# Patient Record
Sex: MALE | Race: WHITE
[De-identification: names, ages, dates, MRNs, and addresses within clinical notes are randomized per-mention and may not be internally consistent; named-entity substitution may affect disease eponyms.]

---

## 2022-11-03 ENCOUNTER — HOSPITAL ENCOUNTER (OUTPATIENT)
Dept: HOSPITAL 75 - ER | Age: 62
LOS: 3 days | Discharge: HOME | End: 2022-11-06
Attending: INTERNAL MEDICINE
Payer: COMMERCIAL

## 2022-11-03 VITALS — WEIGHT: 254.19 LBS | BODY MASS INDEX: 38.97 KG/M2 | HEIGHT: 67.72 IN

## 2022-11-03 VITALS — DIASTOLIC BLOOD PRESSURE: 92 MMHG | SYSTOLIC BLOOD PRESSURE: 132 MMHG

## 2022-11-03 VITALS — SYSTOLIC BLOOD PRESSURE: 119 MMHG | DIASTOLIC BLOOD PRESSURE: 84 MMHG

## 2022-11-03 VITALS — DIASTOLIC BLOOD PRESSURE: 98 MMHG | SYSTOLIC BLOOD PRESSURE: 138 MMHG

## 2022-11-03 VITALS — DIASTOLIC BLOOD PRESSURE: 90 MMHG | SYSTOLIC BLOOD PRESSURE: 135 MMHG

## 2022-11-03 VITALS — DIASTOLIC BLOOD PRESSURE: 80 MMHG | SYSTOLIC BLOOD PRESSURE: 128 MMHG

## 2022-11-03 VITALS — SYSTOLIC BLOOD PRESSURE: 125 MMHG | DIASTOLIC BLOOD PRESSURE: 84 MMHG

## 2022-11-03 VITALS — DIASTOLIC BLOOD PRESSURE: 78 MMHG | SYSTOLIC BLOOD PRESSURE: 117 MMHG

## 2022-11-03 DIAGNOSIS — E66.9: ICD-10-CM

## 2022-11-03 DIAGNOSIS — I25.10: ICD-10-CM

## 2022-11-03 DIAGNOSIS — R73.9: ICD-10-CM

## 2022-11-03 DIAGNOSIS — Z79.899: ICD-10-CM

## 2022-11-03 DIAGNOSIS — I10: ICD-10-CM

## 2022-11-03 DIAGNOSIS — I21.4: Primary | ICD-10-CM

## 2022-11-03 DIAGNOSIS — E78.5: ICD-10-CM

## 2022-11-03 LAB
ALBUMIN SERPL-MCNC: 4.1 GM/DL (ref 3.2–4.5)
ALP SERPL-CCNC: 85 U/L (ref 40–136)
ALT SERPL-CCNC: 21 U/L (ref 0–55)
APTT BLD: 33 SEC (ref 24–35)
BASOPHILS # BLD AUTO: 0.1 10^3/UL (ref 0–0.1)
BASOPHILS NFR BLD AUTO: 1 % (ref 0–10)
BILIRUB SERPL-MCNC: 0.4 MG/DL (ref 0.1–1)
BUN/CREAT SERPL: 15
CALCIUM SERPL-MCNC: 9 MG/DL (ref 8.5–10.1)
CHLORIDE SERPL-SCNC: 104 MMOL/L (ref 98–107)
CO2 SERPL-SCNC: 26 MMOL/L (ref 21–32)
CREAT SERPL-MCNC: 1.33 MG/DL (ref 0.6–1.3)
EOSINOPHIL # BLD AUTO: 0.1 10^3/UL (ref 0–0.3)
EOSINOPHIL NFR BLD AUTO: 1 % (ref 0–10)
GFR SERPLBLD BASED ON 1.73 SQ M-ARVRAT: 60 ML/MIN
GLUCOSE SERPL-MCNC: 203 MG/DL (ref 70–105)
HCT VFR BLD CALC: 45 % (ref 40–54)
HGB BLD-MCNC: 14.4 G/DL (ref 13.3–17.7)
INR PPP: 1 (ref 0.8–1.4)
LIPASE SERPL-CCNC: 29 U/L (ref 8–78)
LYMPHOCYTES # BLD AUTO: 1.2 10^3/UL (ref 1–4)
LYMPHOCYTES NFR BLD AUTO: 20 % (ref 12–44)
MAGNESIUM SERPL-MCNC: 2.1 MG/DL (ref 1.6–2.4)
MANUAL DIFFERENTIAL PERFORMED BLD QL: NO
MCH RBC QN AUTO: 28 PG (ref 25–34)
MCHC RBC AUTO-ENTMCNC: 32 G/DL (ref 32–36)
MCV RBC AUTO: 87 FL (ref 80–99)
MONOCYTES # BLD AUTO: 0.5 10^3/UL (ref 0–1)
MONOCYTES NFR BLD AUTO: 9 % (ref 0–12)
NEUTROPHILS # BLD AUTO: 4.4 10^3/UL (ref 1.8–7.8)
NEUTROPHILS NFR BLD AUTO: 69 % (ref 42–75)
PLATELET # BLD: 289 10^3/UL (ref 130–400)
PMV BLD AUTO: 8.8 FL (ref 9–12.2)
POTASSIUM SERPL-SCNC: 4.4 MMOL/L (ref 3.6–5)
PROT SERPL-MCNC: 7.6 GM/DL (ref 6.4–8.2)
PROTHROMBIN TIME: 13.5 SEC (ref 12.2–14.7)
SODIUM SERPL-SCNC: 140 MMOL/L (ref 135–145)
WBC # BLD AUTO: 6.3 10^3/UL (ref 4.3–11)

## 2022-11-03 PROCEDURE — 80053 COMPREHEN METABOLIC PANEL: CPT

## 2022-11-03 PROCEDURE — 71045 X-RAY EXAM CHEST 1 VIEW: CPT

## 2022-11-03 PROCEDURE — 83735 ASSAY OF MAGNESIUM: CPT

## 2022-11-03 PROCEDURE — 80061 LIPID PANEL: CPT

## 2022-11-03 PROCEDURE — 36415 COLL VENOUS BLD VENIPUNCTURE: CPT

## 2022-11-03 PROCEDURE — 99285 EMERGENCY DEPT VISIT HI MDM: CPT

## 2022-11-03 PROCEDURE — 85025 COMPLETE CBC W/AUTO DIFF WBC: CPT

## 2022-11-03 PROCEDURE — 93041 RHYTHM ECG TRACING: CPT

## 2022-11-03 PROCEDURE — 84484 ASSAY OF TROPONIN QUANT: CPT

## 2022-11-03 PROCEDURE — 85730 THROMBOPLASTIN TIME PARTIAL: CPT

## 2022-11-03 PROCEDURE — 85027 COMPLETE CBC AUTOMATED: CPT

## 2022-11-03 PROCEDURE — 36140 INTRO NDL ICATH UPR/LXTR ART: CPT

## 2022-11-03 PROCEDURE — 93458 L HRT ARTERY/VENTRICLE ANGIO: CPT

## 2022-11-03 PROCEDURE — 71275 CT ANGIOGRAPHY CHEST: CPT

## 2022-11-03 PROCEDURE — 90686 IIV4 VACC NO PRSV 0.5 ML IM: CPT

## 2022-11-03 PROCEDURE — 93005 ELECTROCARDIOGRAM TRACING: CPT

## 2022-11-03 PROCEDURE — 83690 ASSAY OF LIPASE: CPT

## 2022-11-03 PROCEDURE — 93572 IV DOP VEL&/PRESS C FLO EA: CPT

## 2022-11-03 PROCEDURE — 83874 ASSAY OF MYOGLOBIN: CPT

## 2022-11-03 PROCEDURE — 93306 TTE W/DOPPLER COMPLETE: CPT

## 2022-11-03 PROCEDURE — 93571 IV DOP VEL&/PRESS C FLO 1ST: CPT

## 2022-11-03 PROCEDURE — 85379 FIBRIN DEGRADATION QUANT: CPT

## 2022-11-03 PROCEDURE — 85610 PROTHROMBIN TIME: CPT

## 2022-11-03 PROCEDURE — 83880 ASSAY OF NATRIURETIC PEPTIDE: CPT

## 2022-11-03 PROCEDURE — 82947 ASSAY GLUCOSE BLOOD QUANT: CPT

## 2022-11-03 RX ADMIN — SODIUM CHLORIDE SCH MLS/HR: 900 INJECTION, SOLUTION INTRAVENOUS at 21:12

## 2022-11-03 RX ADMIN — METOPROLOL TARTRATE SCH MG: 25 TABLET, FILM COATED ORAL at 21:06

## 2022-11-03 RX ADMIN — DOCUSATE SODIUM SCH MG: 100 CAPSULE ORAL at 21:11

## 2022-11-03 RX ADMIN — SODIUM CHLORIDE SCH MLS/HR: 900 INJECTION, SOLUTION INTRAVENOUS at 17:54

## 2022-11-03 RX ADMIN — INSULIN ASPART SCH UNIT: 100 INJECTION, SOLUTION INTRAVENOUS; SUBCUTANEOUS at 21:11

## 2022-11-03 RX ADMIN — SENNOSIDES SCH MG: 8.6 TABLET, FILM COATED ORAL at 21:11

## 2022-11-03 NOTE — DIAGNOSTIC IMAGING REPORT
CLINICAL INDICATION: Patient with chest pain.



EXAM: Portable chest x-ray upright view.



COMPARISON: None.



FINDINGS:



Lungs/pleura: Lungs are clear. There is no pneumothorax. There is

no pleural effusion.



Mediastinum: Unremarkable. 



Pulmonary vasculature: Unremarkable.



Heart: Cardiac silhouette is upper limits of normal for portable

projection.



Bones/extrathoracic soft tissue: There are hypertrophic spurs

involving the thoracic spine.



IMPRESSION:

There is no radiographic evidence of acute cardiopulmonary

process.



Dictated by: 



  Dictated on workstation # [id number]

## 2022-11-03 NOTE — CONSULTATION-CARDIOLOGY
HPI-Cardiology


Cardiology Consultation


Date of Consultation


11/3/22


Date of Admission





Time Seen by Provider:  14:35


Indication:  Chest pain





HPI


62-year-old gentleman with history of hypertension, hyperlipidemia, has been 

having chest pain on and off for the past week.  Became worse today.  Came into 

the emergency room and he was having active pain.  EKG did not show any acute 

changes, had a strong family history of heart disease.  Noted to have elevation 

in troponin level.





Home Medications & Allergies


Allergies:  


Coded Allergies:  


     No Known Drug Allergies (Unverified , 3/21/07)


Home Medication List Reviewed:  Yes





PMH-Social-Family Hx


Patient Social History


Marital Status:  


Employed/Student:  employed


Have you traveled recently?:  No


Alcohol Use?:  No





Past Medical History


Discussed below





Family Medical History


Significant Family History:  CAD Under 55 Years Old, CAD Over 55 Years Old





Review of Systems-General


Review of Systems


Constitutional:  no symptoms reported, see HPI


EENTM:  see HPI, no symptoms reported


Respiratory:  no symptoms reported, see HPI, dyspnea on exertion


Cardiovascular:  see HPI, chest pain; No edema, No Hx of Intervention, No 

palpitations, No syncope, No vascular heart diseas, No other


Gastrointestinal:  no symptoms reported, see HPI


Genitourinary:  no symptoms reported, see HPI


Musculoskeletal:  no symptoms reported, see HPI


Skin:  no symptoms reported, see HPI


Psychiatric/Neurological:  No Symptoms Reported, See HPI





Reviewed Test Results


Reviewed Test Results


Lab





Laboratory Tests








Test


 11/3/22


10:54 11/3/22


13:00 Range/Units


 


 


White Blood Count


 6.3 


 


 4.3-11.0


10^3/uL


 


Red Blood Count


 5.17 


 


 4.30-5.52


10^6/uL


 


Hemoglobin 14.4   13.3-17.7  g/dL


 


Hematocrit 45   40-54  %


 


Mean Corpuscular Volume 87   80-99  fL


 


Mean Corpuscular Hemoglobin 28   25-34  pg


 


Mean Corpuscular Hemoglobin


Concent 32 


 


 32-36  g/dL





 


Red Cell Distribution Width 13.1   10.0-14.5  %


 


Platelet Count


 289 


 


 130-400


10^3/uL


 


Mean Platelet Volume 8.8 L  9.0-12.2  fL


 


Immature Granulocyte % (Auto) 1    %


 


Neutrophils (%) (Auto) 69   42-75  %


 


Lymphocytes (%) (Auto) 20   12-44  %


 


Monocytes (%) (Auto) 9   0-12  %


 


Eosinophils (%) (Auto) 1   0-10  %


 


Basophils (%) (Auto) 1   0-10  %


 


Neutrophils # (Auto)


 4.4 


 


 1.8-7.8


10^3/uL


 


Lymphocytes # (Auto)


 1.2 


 


 1.0-4.0


10^3/uL


 


Monocytes # (Auto)


 0.5 


 


 0.0-1.0


10^3/uL


 


Eosinophils # (Auto)


 0.1 


 


 0.0-0.3


10^3/uL


 


Basophils # (Auto)


 0.1 


 


 0.0-0.1


10^3/uL


 


Immature Granulocyte # (Auto)


 0.1 


 


 0.0-0.1


10^3/uL


 


Prothrombin Time 13.5   12.2-14.7  SEC


 


INR Comment 1.0   0.8-1.4  


 


Activated Partial


Thromboplast Time 33 


 


 24-35  SEC





 


D-Dimer


 0.66 H


 


 0.00-0.49


UG/ML


 


Sodium Level 140   135-145  MMOL/L


 


Potassium Level 4.4   3.6-5.0  MMOL/L


 


Chloride Level 104     MMOL/L


 


Carbon Dioxide Level 26   21-32  MMOL/L


 


Anion Gap 10   5-14  MMOL/L


 


Blood Urea Nitrogen 20 H  7-18  MG/DL


 


Creatinine


 1.33 H


 


 0.60-1.30


MG/DL


 


Estimat Glomerular Filtration


Rate 60 


 


  





 


BUN/Creatinine Ratio 15    


 


Glucose Level 203 H    MG/DL


 


Calcium Level 9.0   8.5-10.1  MG/DL


 


Corrected Calcium 8.9   8.5-10.1  MG/DL


 


Magnesium Level 2.1   1.6-2.4  MG/DL


 


Total Bilirubin 0.4   0.1-1.0  MG/DL


 


Aspartate Amino Transf


(AST/SGOT) 21 


 


 5-34  U/L





 


Alanine Aminotransferase


(ALT/SGPT) 21 


 


 0-55  U/L





 


Alkaline Phosphatase 85     U/L


 


Myoglobin


 264.2 H


 


 10.0-92.0


NG/ML


 


Troponin I 0.066 H 0.837 *H <0.028  NG/ML


 


B-Type Natriuretic Peptide 25.5   <100.0  PG/ML


 


Total Protein 7.6   6.4-8.2  GM/DL


 


Albumin 4.1   3.2-4.5  GM/DL


 


Lipase 29   8-78  U/L











Physical Exam


Physical Exam


Vital Signs





Vital Signs - First Documented








 11/3/22





 10:48


 


Temp 36.5


 


Pulse 75


 


Resp 20


 


B/P (MAP) 164/103 (123)


 


Pulse Ox 97


 


O2 Delivery Room Air





Capillary Refill :


Height, Weight, BMI


Height: '"


Weight: lbs. oz. kg; 36.00 BMI


Method:Stated


General Appearance:  No Apparent Distress, WD/WN


Eyes:  Bilateral Eye Normal Inspection, Bilateral Eye PERRL, Bilateral Eye EOMI


HEENT:  PERRL/EOMI, TMs Normal, Normal ENT Inspection, Pharynx Normal, Moist 

Mucous Membranes


Neck:  Full Range of Motion, Normal Inspection, Non Tender, Supple, Carotid 

Bruit


Respiratory:  Chest Non Tender, Normal Breath Sounds, No Accessory Muscle Use, 

No Respiratory Distress


Cardiovascular:  Regular Rate, Rhythm, No Edema, No Gallop, No JVD, No Murmur, 

Normal Peripheral Pulses


Gastrointestinal:  Normal Bowel Sounds, No Organomegaly, No Pulsatile Mass, Non 

Tender, Soft


Back:  Normal Inspection, No CVA Tenderness, No Vertebral Tenderness


Extremity:  Normal Capillary Refill, Normal Inspection, Normal Range of Motion, 

Non Tender, No Calf Tenderness, No Pedal Edema


Neurologic/Psychiatric:  Alert, Oriented x3, No Motor/Sensory Deficits, Normal 

Mood/Affect


Skin:  Normal Color, Warm/Dry


Lymphatic:  No Adenopathy





A/P-Cardiology


Admission Diagnosis


Chest pain


Non-ST elevation myocardial infarction


Hypertension


Hyperlipidemia





Assessment/Plan


Chest pain, unstable angina, non-ST elevation myocardial infarction, elevation 

in troponin level


Plan to proceed with cardiac catheterization possible PTCA





Coronary artery disease, had a cardiac catheterization 2008 showing mild to 

moderate coronary artery.  Planning to proceed with cardiac catheter





Hypertension, restart home medication monitor blood pressure





Hyperlipidemia, monitor lipids





Strong family history of heart disease.





Clinical Quality Measures


AMI/AHF:


ASA po Prior to arrival:  REMY Salgado MD               Nov 3, 2022 14:37

## 2022-11-03 NOTE — DIAGNOSTIC IMAGING REPORT
PROCEDURE: CT angiography of the chest with contrast.



TECHNIQUE: Multiple contiguous axial images were obtained through

the chest after uneventful bolus administration of intravenous

contrast. 3D reconstructed CTA MIP acquisitions were also

performed.

Auto Exposure Controls were utilized during the CT exam to meet

ALARA standards for radiation dose reduction. 



 

INDICATION:  Chest pain and elevated d-dimer.



FINDINGS: There is good opacification of pulmonary arteries

without intraluminal filling defect. Thoracic aorta is of normal

caliber. There is extensive atherosclerotic calcification within

coronary arteries. Focal groundglass density in the right upper

lobe may represent minimal edema or pneumonitis. There is no

consolidation or suspicious mass lesion. No significant pleural

or pericardial fluid is identified and there is no evidence of

pathologically enlarged adenopathy.



IMPRESSION: No CTA evidence of pulmonary embolism or other great

vessel abnormality in the thorax. There is coronary artery

disease and minimal right upper lobe edema or pneumonitis.



Dictated by: 



  Dictated on workstation # [id number]

## 2022-11-03 NOTE — CARDIAC CATH REPORT
Cardiac Cath Report


Physician (s)/Assistant (s)


Physician


REMY CAMARILLO MD





Pre-Procedure Diagnosis


Pre-Procedure Diagnosis:  NSTMI





Post-Procedure Note


Procedure Start Date:  Nov 3, 2022


Name of Procedure:  


Left heart catheterization


IFR to the right coronary artery


IFR to the LAD


IFR to the circumflex


Findings/Procedure Note


PROCEDURE NOTE:


Patient was admitted with acute non-ST elevation myocardial infarction.


After explaining the procedure to the patient, all pros and cons were explained,

all questions were answered.  The patient signed the consent and then he  was 

placed on the cardiac catheterization laboratory. Groin was prepped SL fashion 

local anesthesia was used. Sheath placed in the right femoral artery. Josie 

right and left catheter were used to access the coronary system.  Josie right 

was prolapsed to the left ventricular cavity, pressure was measured, pullback LV

to aorta was done.





Patient was noted to have diffuse ectasia with multiple lesions in all his 

coronaries.  Josie right guide was advanced then IFR wire was advanced through

the right coronary artery and the measurement was 0.97.


I used multiple different guides then I was able to intubate the left main with 

EBU 3.5 guide.  IFR wire was advanced to the LAD and the value was 0.97.  Then 

retracted and advanced into the circumflex artery and IFR was 0.98 through the 

circumflex system.


At the end of the procedure the sheath was removed. Closure device was deployed





FINDINGS:





Hemodynamics 


/22, end-diastolic pressure of 22


Aorta 120/84 mean of 83





ANATOMY:


Left Main is free of obstructive disease


Left Anterior Descending has significant ectasia with aneurysm in the mid LAD, 

IFR through the LAD was 0.97.  The first diagonal artery has mid subtotal 

occlusion in its moderate to small in size.  Fairly distal artery.  Medical 

therapy is recommended


Left Circumflex has diffuse ectasia with ostial stenosis, IFR through the 

circumflex artery was 0.98


Right Coronary Artery has diffuse ectasia with a lesion in the proximal and mid 

right coronary artery and IFR through the right coronary artery was 0.97


LV Gram was not done, pressure was measured





CONCLUSION:


1.  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 

LAD and proximal circumflex artery.  Multiple moderate lesions nonobstructive 

disease in the coronary system with IFR through the LAD and right coronary 

artery and the circumflex artery around 0.97.


2.  Subtotal occlusion at the mid to distal first diagonal artery which is small

to moderate in size.  Medical therapy is recommended


3.  Mildly elevated left ventricular end-diastolic pressure





DISCUSSION AND RECOMMENDATION:


Maximizing medical therapy is recommended no intervention is warranted


Anesthesia Type:  Conscious Sedation


Estimated blood loss (mL):  35 ml


Contrast Amount:  105 ml


Total Radiation Dose:  1411 mGy





Post-Procedure Diagnosis


Post-operative diagnosis:  


Non-ST elevation myocardial infarction


Coronary artery disease


Hypertension


Hyperlipidemia











REMY CAMARILLO MD               Nov 3, 2022 16:35

## 2022-11-03 NOTE — ED CHEST PAIN
General


Chief Complaint:  Chest Pain


Stated Complaint:  CHEST PAINS


Nursing Triage Note:  


PT TO [facility] 3 FROM [facility] WITH CC OF CHEST PAIN, STATES HE TOOK 1000 MG IBUPROFEN AND 


3 ALEVE PTA, PAIN STARTED ABOUT 0900, PT ATE 3 HAM AND CHEESE SANDWICHES BEFORE 


THAT THEN HIS HEART STARTED RACING, NOT THE FIRST TIME THIS HAS HAPPENED, ONCE 


ABOUT A WEEK AGO AFTER DOING YARD WORK, HAS NOT SEEN A DR FOR THIS BEFORE TODAY,




FATHER AND BROTHER  OF AMI'S. 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





History of Present Illness


Date Seen by Provider:  Nov 3, 2022


Time Seen by Provider:  11:25


Initial Comments


Patient presents to the emergency department for chest pain. States that he took

2000mg of Ibuprofen and 3 Aleve prior to arrival. Reports that his pain is a 

little better at this time. Did eat three cheese sandwiches prior to coming in 

also. Has never been seen for chest pain before. Reports that he had similar 

symptoms about a week ago after doing some yard work. Father and brother both 

 of MI.


Timing/Duration:  1-3 hours


Severity/Quality:  moderate


Location:  substernal


Radiation:  no radiation


Prior CP/Workup:  no prior cardiac workup


Modifying Factors:  worse with exercise


Associated Symptoms:  No abdominal pain, No nausea/vomiting





Allergies and Home Medications


Allergies


Coded Allergies:  


     No Known Drug Allergies (Unverified , 3/21/07)





Patient Home Medication List


Home Medication List Reviewed:  Yes


Ibuprofen (Ibuprofen) 200 Mg Capsule, 200 MG PO PRN


   Prescribed by: DAYLIN KOROMA on 11/3/22 2923


   Last Action: Reviewed


Discontinued Medications


Naproxen (Naprosyn) 500 Mg Tablet, 1 EACH PO BID


   Discontinued Reason: No Longer Taking


   Prescribed by: BÁRBARA BURTON on 2/11/10 1237


   Last Action: Discontinued


Phenazopyridine Hcl (Pyridium) 200 Mg Tablet, 1 EACH PO TID PRN


   Discontinued Reason: No Longer Taking


   Prescribed by: RIKY BARNETT on 10/18/10 1301


   Last Action: Discontinued


Trimethoprim/Sulfamethoxazole (Bactrim Ds) 1 Ea Tablet, 1 EA PO BID


   Discontinued Reason: No Longer Taking


   Prescribed by: RIKY BARNETT on 10/18/10 1301


   Last Action: Discontinued





Review of Systems


Review of Systems


Constitutional:  No chills, No dizziness, No fever


Respiratory:  Denies Cough


Cardiovascular:  Chest Pain; Denies Edema, Denies Irregular Heart Rate, Denies 

Lightheadedness, Denies Palpitations, Denies Syncope


Gastrointestinal:  Denies Abdominal Pain, Denies Nausea, Denies Vomiting


Genitourinary:  Denies Burning, Denies Frequency


Musculoskeletal:  No back pain


Skin:  no symptoms reported





All Other Systems Reviewed


Negative Unless Noted:  Yes





Past Medical-Social-Family Hx


Patient Social History


Tobacco Use?:  No


Substance use?:  No


Alcohol Use?:  No





Immunizations Up To Date


Third COVID19 Vaccination Date:  YES


COVID19 Vaccine :  MODERNA





Past Medical History


Surgery/Hospitalization HX:  


DOESN'T SEE A DR


Reproductive Disorders:  No





Family Medical History


Reviewed Nursing Family Hx





Physical Exam


Vital Signs





Vital Signs - First Documented








 11/3/22





 10:48


 


Temp 36.5


 


Pulse 75


 


Resp 20


 


B/P (MAP) 164/103 (123)


 


Pulse Ox 97


 


O2 Delivery Room Air





Capillary Refill :


Height, Weight, BMI


Height: '"


Weight: lbs. oz. kg; 36.00 BMI


Method:Stated


General Appearance:  No Apparent Distress


Neck:  Full Range of Motion, Normal Inspection, Non Tender, Supple


Respiratory:  Chest Non Tender, Lungs Clear, Normal Breath Sounds


Cardiovascular:  Regular Rate, Rhythm


Gastrointestinal:  Normal Bowel Sounds, No Organomegaly, No Pulsatile Mass, Non 

Tender, Soft


Neurologic/Psychiatric:  Alert, Oriented x3


Skin:  Normal Color, Warm/Dry





Progress/Results/Core Measures


Results/Orders


Lab Results





Laboratory Tests








Test


 11/3/22


10:54 11/3/22


13:00 Range/Units


 


 


White Blood Count


 6.3 


 


 4.3-11.0


10^3/uL


 


Red Blood Count


 5.17 


 


 4.30-5.52


10^6/uL


 


Hemoglobin 14.4   13.3-17.7  g/dL


 


Hematocrit 45   40-54  %


 


Mean Corpuscular Volume 87   80-99  fL


 


Mean Corpuscular Hemoglobin 28   25-34  pg


 


Mean Corpuscular Hemoglobin


Concent 32 


 


 32-36  g/dL





 


Red Cell Distribution Width 13.1   10.0-14.5  %


 


Platelet Count


 289 


 


 130-400


10^3/uL


 


Mean Platelet Volume 8.8 L  9.0-12.2  fL


 


Immature Granulocyte % (Auto) 1    %


 


Neutrophils (%) (Auto) 69   42-75  %


 


Lymphocytes (%) (Auto) 20   12-44  %


 


Monocytes (%) (Auto) 9   0-12  %


 


Eosinophils (%) (Auto) 1   0-10  %


 


Basophils (%) (Auto) 1   0-10  %


 


Neutrophils # (Auto)


 4.4 


 


 1.8-7.8


10^3/uL


 


Lymphocytes # (Auto)


 1.2 


 


 1.0-4.0


10^3/uL


 


Monocytes # (Auto)


 0.5 


 


 0.0-1.0


10^3/uL


 


Eosinophils # (Auto)


 0.1 


 


 0.0-0.3


10^3/uL


 


Basophils # (Auto)


 0.1 


 


 0.0-0.1


10^3/uL


 


Immature Granulocyte # (Auto)


 0.1 


 


 0.0-0.1


10^3/uL


 


Prothrombin Time 13.5   12.2-14.7  SEC


 


INR Comment 1.0   0.8-1.4  


 


Activated Partial


Thromboplast Time 33 


 


 24-35  SEC





 


D-Dimer


 0.66 H


 


 0.00-0.49


UG/ML


 


Sodium Level 140   135-145  MMOL/L


 


Potassium Level 4.4   3.6-5.0  MMOL/L


 


Chloride Level 104     MMOL/L


 


Carbon Dioxide Level 26   21-32  MMOL/L


 


Anion Gap 10   5-14  MMOL/L


 


Blood Urea Nitrogen 20 H  7-18  MG/DL


 


Creatinine


 1.33 H


 


 0.60-1.30


MG/DL


 


Estimat Glomerular Filtration


Rate 60 


 


  





 


BUN/Creatinine Ratio 15    


 


Glucose Level 203 H    MG/DL


 


Calcium Level 9.0   8.5-10.1  MG/DL


 


Corrected Calcium 8.9   8.5-10.1  MG/DL


 


Magnesium Level 2.1   1.6-2.4  MG/DL


 


Total Bilirubin 0.4   0.1-1.0  MG/DL


 


Aspartate Amino Transf


(AST/SGOT) 21 


 


 5-34  U/L





 


Alanine Aminotransferase


(ALT/SGPT) 21 


 


 0-55  U/L





 


Alkaline Phosphatase 85     U/L


 


Myoglobin


 264.2 H


 


 10.0-92.0


NG/ML


 


Troponin I 0.066 H 0.837 *H <0.028  NG/ML


 


B-Type Natriuretic Peptide 25.5   <100.0  PG/ML


 


Total Protein 7.6   6.4-8.2  GM/DL


 


Albumin 4.1   3.2-4.5  GM/DL


 


Lipase 29   8-78  U/L








My Orders





Orders - ELVIRA OWUSU E APRN


Cbc With Automated Diff (11/3/22 11:30)


Magnesium (11/3/22 11:30)


Chest 1 View, Ap/Pa Only (11/3/22 11:30)


Comprehensive Metabolic Panel (11/3/22 11:30)


Myoglobin Serum (11/3/22 11:30)


Protime With Inr (11/3/22 11:30)


Partial Thromboplastin Time (11/3/22 11:30)


O2 (11/3/22 11:30)


Monitor-Rhythm Ecg Trace Only (11/3/22 11:30)


Lipid Panel (22 06:00)


Ed Iv/Invasive Line Start (11/3/22 11:30)


Lipase (11/3/22 11:30)


Bnp Shaggy (11/3/22 11:30)


Fibrin Degradation Products (11/3/22 11:30)


Troponin I Shaggy (11/3/22 11:30)


Aspirin Chewable Tablet (Baby Aspirin Ch (11/3/22 11:30)


Ct Angio Chest W (11/3/22 12:17)


Ns Iv 1000 Ml (Sodium Chloride 0.9%) (11/3/22 12:30)


Iohexol Injection (Omnipaque 350 Mg/Ml 1 (11/3/22 12:30)


Received Contrast (Hold Metformin- Contr (11/3/22 12:30)


Ns (Ivpb) (Sodium Chloride 0.9% Ivpb Bag (11/3/22 12:30)


Sodium Chloride Flush (Catheter Flush Sy (11/3/22 12:30)


Troponin I [place] (11/3/22 12:37)


Nitroglycerin Ointment (Nitrobid Ointme (11/3/22 12:45)


Enoxaparin Injection (Lovenox Injection) (11/3/22 13:45)


Ns Iv 1000 Ml (Sodium Chloride 0.9%) (11/3/22 14:45)





Medications Given in ED





Current Medications








 Medications  Dose


 Ordered  Sig/Eun


 Route  Start Time


 Stop Time Status Last Admin


Dose Admin


 


 Aspirin  324 mg  ONCE  ONCE


 PO  11/3/22 11:30


 11/3/22 11:32 DC 11/3/22 11:35


324 MG


 


 Iohexol  100 ml  ONCE  ONCE


 IV  11/3/22 12:30


 11/3/22 12:32 DC 11/3/22 12:53


84 ML


 


 Nitroglycerin  1 inch  ONCE  ONCE


 TOP  11/3/22 12:45


 11/3/22 12:46 DC 11/3/22 12:50


1 INCH


 


 Sodium Chloride  10 ml  AS NEEDED  PRN


 IV  11/3/22 12:30


    11/3/22 12:53


10 ML


 


 Sodium Chloride  100 ml  ONCE  ONCE


 IV  11/3/22 12:30


 11/3/22 12:32 DC 11/3/22 12:53


80 ML








Vital Signs/I&O











 11/3/22





 10:48


 


Temp 36.5


 


Pulse 75


 


Resp 20


 


B/P (MAP) 164/103 (123)


 


Pulse Ox 97


 


O2 Delivery Room Air














Blood Pressure Mean:                    123











Progress


Progress Note :  


Progress Note


patient with elevated troponin on labs. Spoke to cardiology and [facility] and patient 

will be admitted. Cardiology came to the department and took patient directly to

the cath lab.


Initial ECG Impression Date:  Nov 3, 2022


Initial ECG Impression Time:  10:52


Initial ECG Rate:  89


Initial ECG Rhythm:  A Fib/Flutter


Initial ECG Impression:  Atrial Fibrillation





Departure


Communication (Admissions)


Time/Spoke to Admitting Phy:  13:24


Jax


Time/Spoke to Consulting Phy:  12:53


Sujey





Impression





   Primary Impression:  


   NSTEMI (non-ST elevated myocardial infarction)


Disposition:   ADMITTED AS INPATIENT


Condition:  Stable





Admissions


Decision to Admit Reason:  Admit from ER (General)


Decision to Admit/Date:  Nov 3, 2022


Time/Decision to Admit Time:  13:24





Departure-Patient Inst.


Decision time for Depature:  13:24


Referrals:  


[facility] (PCP/Family)


Primary Care Physician


Scripts


Ibuprofen (Ibuprofen) 200 Mg Capsule


200 MG PO PRN for 30 Days, CAP


   Prov: REMY CAMARILLO MD         11/3/22











ELVIRA OWUSU        Nov 3, 2022 11:26

## 2022-11-03 NOTE — CARDIAC PROCEDURE NOTE-CS/ASA
Pre-Procedure Note


Pre-Op Procedure Note


Date of Available H&P:  Nov 3, 2022


Date H&P Reviewed:  Nov 3, 2022


Time H&P Reviewed:  14:37


History & Physical:  H&P Reviewed, Patient Examed, No changes noted


Pre-Operative Diagnosis:  NSTMI





Conscious Sedation Pre-Proced


Time


14:38





ASA Score


3


For ASA 3 and 4: Consider anesthesia and medical clearance. Also, for patients

with a history of failed moderate sedation consider anesthesia.

















Airway 


 


Lungs 


 


Heart 


 


 ASA score


 


 ASA 1: a normal healthy patient


 


 ASA 2:  a patient with a mild systemic disease (mid diabetes, controlled 

hypertension, obesity 


 


 ASA 3:  a patient with a severe systemic disease that limits activity  (angina,

COPD, prior Myocardial infarction)


 


 ASA 4:  a patient with an incapacitating disease that is a constant threat to 

life (CHF, renal failure)


 


 ASA 5:  a moribund patient not expected to survive 24 hrs.  (ruptured aneurysm)


 


 ASA 6:  a declared brain-dead patient whose organs are being harvested.


 


 For emergent operations, add the letter E after the classification











Mallampati Classification


Grade 3





Sedation Plan


Analgesia, Amnesia, Plan communicated to team members, Discussed options with 

patient/fam, Discussed risks with patient/fam


The patient is an appropriate candidate to undergo the planned procedure, 

sedation, and anesthesia.





The patient immediately re-assessed prior to indication.











REMY CAMARILLO MD               Nov 3, 2022 14:38

## 2022-11-03 NOTE — HISTORY & PHYSICAL-HOSPITALIST
WILIAN VALLECILLO 11/3/22 1501:


History of Present Illness


HPI/Chief Complaint


Patient is a 63 yo man presenting with left-sided chest pain. He describes it as

a constant ache that radiates to his left shoulder and down his arm. Notes that 

the pain has been intermittent for the last week or so but is much worse today, 

prompting him to seek care at the ER. The patient states that he had been doing 

some yard work a few days ago and at first thought that the discomfort in his 

chest and shoulder were the result of overwork. He notes similar occurrences in 

the past in which he had to have a cardiac cath but notes that last time, his 

symptoms were accompanied by more shortness of breath. He indicates SOB with 

exertion, intermittent swelling of his ankles, He denies nausea, vomiting, 

dizziness, lightheadedness, headaches, or syncope. He does not regularly see a 

doctor, takes no medications, and has a previous medical history of kidney 

cancer when he was 25 for which he received chemotherapy. Family history of 

myocardial infarction in his father ( at 52 from MI) and brother (Had 6 

previous bypass procedures before dying in his sleep). He does not smoke, drink 

alcohol, or use illicit drugs. Notes that he has seasonal allergies and 

experienced what he believes to be a cold a few weeks ago. Wife believes that he

has undiagnosed diabetes due to shakiness and agitation if he goes too long 

without eating.


Source:  patient, family, RN/MD, RN notes reviewed


Exam Limitations:  no limitations


Date Seen


11/3/22


Time Seen by a Provider:  14:00


Attending Physician


[place]


PCP


Admitting Physician:


 Joelle Dotson DO








Attending Physician:


Joelle Dotson DO


Referring Physician





Date of Admission


2022





Home Medications & Allergies


Home Medications


Reviewed patient Home Medication Reconciliation performed by pharmacy medication

reconciliations technician and/or nursing.


Patients Allergies have been reviewed.





Allergies





Allergies


Coded Allergies


  No Known Drug Allergies (Unverified3/21/07)





Past Medical-Social-Family Hx


Patient Social History


Marrital Status:  


Employed/Student:  employed


Tobacco Use?:  No


Substance use?:  No


Alcohol Use?:  No





Seasonal Allergies


Seasonal Allergies:  Yes





Current Status


Primary Language:  English


Preferred Spoken Language:  English


Sensory deficits:  Hearing impairment


Implanted or Applied Medical D:  None





Past Medical History


Hearing Impairment:  Hard of Hearing


Kidney


Did You Recieve Any Treatments:  Yes


What Type of Treatment Did You:  Chemotherapy, Surgical Intervention





Patient states that he had kidney cancer when he was 25.





Family Medical History


Heart Disease, CAD Under 55 Years Old, CAD Over 55 Years Old





Review of Systems


Constitutional:  see HPI; No chills, No dizziness, No fever


EENTM:  no symptoms reported


Respiratory:  see HPI, dyspnea on exertion


Cardiovascular:  see HPI, chest pain, edema (Minor swelling in legs); No syncope


Gastrointestinal:  no symptoms reported, see HPI; No abdominal pain, No nausea, 

No vomiting


Genitourinary:  no symptoms reported


Musculoskeletal:  no symptoms reported, see HPI


Skin:  no symptoms reported


Psychiatric/Neurological:  No Symptoms Reported





Physical Exam


Physical Exam


Vital Signs





Vital Signs - First Documented








 11/3/22





 10:48


 


Temp 36.5


 


Pulse 75


 


Resp 20


 


B/P (MAP) 164/103 (123)


 


Pulse Ox 97


 


O2 Delivery Room Air





Capillary Refill :


Height, Weight, BMI


Height: 172 cm


Weight: 109 kg; 36.00 BMI


Method:Stated


General Appearance:  No Apparent Distress, WD/WN, Obese


HEENT:  PERRL/EOMI


Neck:  Full Range of Motion, Normal Inspection, Non Tender, Supple; No JVD


Respiratory:  Chest Non Tender, Lungs Clear, Normal Breath Sounds, No Accessory 

Muscle Use, No Respiratory Distress


Cardiovascular:  Regular Rate, Rhythm, No Gallop, No JVD, No Murmur, Normal 

Peripheral Pulses


Gastrointestinal:  Normal Bowel Sounds, No Organomegaly, No Pulsatile Mass, Non 

Tender, Soft


Rectal:  Deferred


Back:  Normal Inspection, No Vertebral Tenderness


Extremity:  Normal Inspection, Normal Range of Motion, Non Tender, No Calf 

Tenderness, Swelling (Trace swelling in ankles bilaterally)


Neurologic/Psychiatric:  Alert, Oriented x3, No Motor/Sensory Deficits, Normal 

Mood/Affect


Skin:  Normal Color, Warm/Dry





Results


Results/Procedures


Labs


Laboratory Tests


11/3/22 10:54








Patient resulted labs reviewed.


Imaging


Date of Exam:22





CT ANGIO CHEST W








PROCEDURE: CT angiography of the chest with contrast.





TECHNIQUE: Multiple contiguous axial images were obtained through


the chest after uneventful bolus administration of intravenous


contrast. 3D reconstructed CTA MIP acquisitions were also


performed.


Auto Exposure Controls were utilized during the CT exam to meet


ALARA standards for radiation dose reduction. 





 


INDICATION:  Chest pain and elevated d-dimer.





FINDINGS: There is good opacification of pulmonary arteries


without intraluminal filling defect. Thoracic aorta is of normal


caliber. There is extensive atherosclerotic calcification within


coronary arteries. Focal groundglass density in the right upper


lobe may represent minimal edema or pneumonitis. There is no


consolidation or suspicious mass lesion. No significant pleural


or pericardial fluid is identified and there is no evidence of


pathologically enlarged adenopathy.





IMPRESSION: No CTA evidence of pulmonary embolism or other great


vessel abnormality in the thorax. There is coronary artery


disease and minimal right upper lobe edema or pneumonitis.





  Dictated on workstation # [id number]








Dict:   [id number]


Trans:   [id number]


 [id number]





Interpreted by:     JEANINE SANTOS MD








Date of Exam:22





CHEST 1 VIEW, AP/PA ONLY








CLINICAL INDICATION: Patient with chest pain.





EXAM: Portable chest x-ray upright view.





COMPARISON: None.





FINDINGS:





Lungs/pleura: Lungs are clear. There is no pneumothorax. There is


no pleural effusion.





Mediastinum: Unremarkable. 





Pulmonary vasculature: Unremarkable.





Heart: Cardiac silhouette is upper limits of normal for portable


projection.





Bones/extrathoracic soft tissue: There are hypertrophic spurs


involving the thoracic spine.





IMPRESSION:


There is no radiographic evidence of acute cardiopulmonary


process.








  Dictated on workstation # [id number]








Dict:   [id number]


Trans:   22 [id number]


 [id number]





Interpreted by:     OLESYA UNNEZ MD








Both imaging reports are currently in draft stage


Meds


As stated in meds list.


Enoxaparin for DVT prophylaxis. NaCl 1,000 @ 100 mls/hr.


Nitroglycerin, aspirin administered in ER.


Orders placed for heparin, midazolam, fentanyl citrate, verapamil, lidocaine 

placed


Procedures


Patient to undergo cardiac catheterization





Assessment/Plan


Admission Diagnosis


Chest pain (NSTEMI)


Admission Status:  Inpatient Order (span 2 midnights)


Reason for Inpatient Admission:  


Patient to be admitted for cardiac catheterization and medication


management. Due to concerning symptoms for acute cardiac disease,


inpatient care is advised.





Assessment and Plan


Chest pain, elevated troponin (NSTEMI) - patient to undergo cardiac 

catheterization.


Hypertension - begin treatment with anti-hypertensive. Lisinopril is not 

contraindicated. Continue close monitoring of BP measurements.


CAD - Patient had a cardiac catheterization in  showing mild to moderate 

coronary artery.


Hyperlipidemia - Monitor lipid levels


Elevated D-dimer - enoxaparin initiated for DVT prophylaxis


Family history of heart disease


Hyperglycemia - further work-up for possible Type II Diabetes Mellitus needed





Clinical Quality Measures


AMI/AHF:


ASA po Prior to arrival:  No





DVT/VTE Risk/Contraindication:


VTE Addressed:  Yes


VTE Present on Admission:  No





JOELLE DOTSON  22 [id number]:


History of Present Illness


HPI/Chief Complaint


Chief complaint: Chest pain with NSTEMI





HPI: This is a 62-year-old male with a past medical history of hypertension who 

presents with chest pain.  Elevated troponin noted.  He underwent cardiac 

catheterization and showed no evidence of any need for intervention.


Source:  patient, family


Exam Limitations:  no limitations





Past Medical-Social-Family Hx


Patient Social History


Marrital Status:  


Employed/Student:  retired


Smoking Status:  Former Smoker





Past Medical History


High Cholesterol, Hypertension





Review of Systems


Constitutional:  see HPI


Cardiovascular:  chest pain





Physical Exam


Physical Exam


General Appearance:  No Apparent Distress, Chronically ill, Obese


Eyes:  Right Eye Normal Inspection, Right Eye PERRL


HEENT:  PERRL/EOMI, Normal ENT Inspection, Pharynx Normal, Moist Mucous 

Membranes


Neck:  Full Range of Motion, Normal Inspection, Non Tender


Respiratory:  Chest Non Tender, Lungs Clear, Normal Breath Sounds, No Accessory 

Muscle Use, No Respiratory Distress


Cardiovascular:  Regular Rate, Rhythm, No Edema, No Gallop, No JVD, No Murmur, 

Normal Peripheral Pulses


Gastrointestinal:  Normal Bowel Sounds, No Organomegaly, No Pulsatile Mass, Non 

Tender, Soft


Back:  Normal Inspection, No CVA Tenderness, No Vertebral Tenderness


Extremity:  Normal Capillary Refill, Normal Inspection, Normal Range of Motion, 

Non Tender, No Calf Tenderness, No Pedal Edema


Neurologic/Psychiatric:  Alert, Oriented x3, No Motor/Sensory Deficits, Normal 

Mood/Affect


Skin:  Normal Color, Warm/Dry


Lymphatic:  No Adenopathy





Assessment/Plan


Admission Diagnosis


Chest pain


NSTEMI


Obesity


Hypertension





Plan:


Cardiac cath


Admission Status:  Observation





Supervisory-Addendum Brief


Verification & Attestation


Participated in pt care:  history, MDM, physical


Personally performed:  exam, history, MDM, supervision of care


Care discussed with:  Medical Student


Procedures:  n/a


Results interpretation:  Verified all documentation


Verification and Attestation of Medical Student E/M Service





A medical student performed and documented this service in my presence. I 

reviewed and verified all information documented by the medical student and made

modifications to such information, when appropriate. I personally performed the 

physical exam and medical decision making. 





 Joelle S Dotson, 2022,05:12











WILIAN VALLECILLO                 Nov 3, 2022 15:01


JOELLE DOTSON DO                 2022 05:13

## 2022-11-04 VITALS — DIASTOLIC BLOOD PRESSURE: 72 MMHG | SYSTOLIC BLOOD PRESSURE: 116 MMHG

## 2022-11-04 VITALS — SYSTOLIC BLOOD PRESSURE: 106 MMHG | DIASTOLIC BLOOD PRESSURE: 67 MMHG

## 2022-11-04 VITALS — SYSTOLIC BLOOD PRESSURE: 113 MMHG | DIASTOLIC BLOOD PRESSURE: 68 MMHG

## 2022-11-04 VITALS — DIASTOLIC BLOOD PRESSURE: 66 MMHG | SYSTOLIC BLOOD PRESSURE: 105 MMHG

## 2022-11-04 VITALS — SYSTOLIC BLOOD PRESSURE: 144 MMHG | DIASTOLIC BLOOD PRESSURE: 88 MMHG

## 2022-11-04 LAB
ALBUMIN SERPL-MCNC: 3.2 GM/DL (ref 3.2–4.5)
ALP SERPL-CCNC: 64 U/L (ref 40–136)
ALT SERPL-CCNC: 17 U/L (ref 0–55)
BASOPHILS # BLD AUTO: 0.1 10^3/UL (ref 0–0.1)
BASOPHILS NFR BLD AUTO: 1 % (ref 0–10)
BILIRUB SERPL-MCNC: 0.5 MG/DL (ref 0.1–1)
BUN/CREAT SERPL: 17
CALCIUM SERPL-MCNC: 8.2 MG/DL (ref 8.5–10.1)
CHLORIDE SERPL-SCNC: 110 MMOL/L (ref 98–107)
CHOLEST SERPL-MCNC: 160 MG/DL (ref ?–200)
CO2 SERPL-SCNC: 22 MMOL/L (ref 21–32)
CREAT SERPL-MCNC: 1.03 MG/DL (ref 0.6–1.3)
EOSINOPHIL # BLD AUTO: 0.1 10^3/UL (ref 0–0.3)
EOSINOPHIL NFR BLD AUTO: 1 % (ref 0–10)
GFR SERPLBLD BASED ON 1.73 SQ M-ARVRAT: 82 ML/MIN
GLUCOSE SERPL-MCNC: 111 MG/DL (ref 70–105)
HCT VFR BLD CALC: 38 % (ref 40–54)
HDLC SERPL-MCNC: 28 MG/DL (ref 40–60)
HGB BLD-MCNC: 11.9 G/DL (ref 13.3–17.7)
LYMPHOCYTES # BLD AUTO: 1.3 10^3/UL (ref 1–4)
LYMPHOCYTES NFR BLD AUTO: 18 % (ref 12–44)
MANUAL DIFFERENTIAL PERFORMED BLD QL: NO
MCH RBC QN AUTO: 28 PG (ref 25–34)
MCHC RBC AUTO-ENTMCNC: 31 G/DL (ref 32–36)
MCV RBC AUTO: 89 FL (ref 80–99)
MONOCYTES # BLD AUTO: 0.6 10^3/UL (ref 0–1)
MONOCYTES NFR BLD AUTO: 9 % (ref 0–12)
NEUTROPHILS # BLD AUTO: 5 10^3/UL (ref 1.8–7.8)
NEUTROPHILS NFR BLD AUTO: 70 % (ref 42–75)
PLATELET # BLD: 239 10^3/UL (ref 130–400)
PMV BLD AUTO: 8.8 FL (ref 9–12.2)
POTASSIUM SERPL-SCNC: 4.5 MMOL/L (ref 3.6–5)
PROT SERPL-MCNC: 6 GM/DL (ref 6.4–8.2)
SODIUM SERPL-SCNC: 139 MMOL/L (ref 135–145)
TRIGL SERPL-MCNC: 199 MG/DL (ref ?–150)
VLDLC SERPL CALC-MCNC: 40 MG/DL (ref 5–40)
WBC # BLD AUTO: 7.2 10^3/UL (ref 4.3–11)

## 2022-11-04 RX ADMIN — METOPROLOL TARTRATE SCH MG: 25 TABLET, FILM COATED ORAL at 21:19

## 2022-11-04 RX ADMIN — CLOPIDOGREL BISULFATE SCH MG: 75 TABLET, FILM COATED ORAL at 09:24

## 2022-11-04 RX ADMIN — ASPIRIN SCH MG: 81 TABLET ORAL at 09:24

## 2022-11-04 RX ADMIN — INSULIN ASPART SCH UNIT: 100 INJECTION, SOLUTION INTRAVENOUS; SUBCUTANEOUS at 06:38

## 2022-11-04 RX ADMIN — INSULIN ASPART SCH UNIT: 100 INJECTION, SOLUTION INTRAVENOUS; SUBCUTANEOUS at 21:19

## 2022-11-04 RX ADMIN — DOCUSATE SODIUM SCH MG: 100 CAPSULE ORAL at 09:21

## 2022-11-04 RX ADMIN — SENNOSIDES SCH MG: 8.6 TABLET, FILM COATED ORAL at 21:19

## 2022-11-04 RX ADMIN — DOCUSATE SODIUM SCH MG: 100 CAPSULE ORAL at 21:19

## 2022-11-04 RX ADMIN — PANTOPRAZOLE SODIUM SCH MG: 40 TABLET, DELAYED RELEASE ORAL at 09:25

## 2022-11-04 RX ADMIN — LOSARTAN POTASSIUM SCH MG: 25 TABLET, FILM COATED ORAL at 09:24

## 2022-11-04 RX ADMIN — ENOXAPARIN SODIUM SCH MG: 150 INJECTION SUBCUTANEOUS at 04:36

## 2022-11-04 RX ADMIN — INSULIN ASPART SCH UNIT: 100 INJECTION, SOLUTION INTRAVENOUS; SUBCUTANEOUS at 17:12

## 2022-11-04 RX ADMIN — SODIUM CHLORIDE SCH MLS/HR: 900 INJECTION, SOLUTION INTRAVENOUS at 12:36

## 2022-11-04 RX ADMIN — SODIUM CHLORIDE SCH MLS/HR: 900 INJECTION, SOLUTION INTRAVENOUS at 22:30

## 2022-11-04 RX ADMIN — METOPROLOL TARTRATE SCH MG: 25 TABLET, FILM COATED ORAL at 09:24

## 2022-11-04 RX ADMIN — ACETAMINOPHEN PRN MG: 325 TABLET ORAL at 00:59

## 2022-11-04 RX ADMIN — SENNOSIDES SCH MG: 8.6 TABLET, FILM COATED ORAL at 09:23

## 2022-11-04 RX ADMIN — INSULIN ASPART SCH UNIT: 100 INJECTION, SOLUTION INTRAVENOUS; SUBCUTANEOUS at 12:35

## 2022-11-04 RX ADMIN — ISOSORBIDE MONONITRATE SCH MG: 30 TABLET, EXTENDED RELEASE ORAL at 09:24

## 2022-11-04 RX ADMIN — ENOXAPARIN SODIUM SCH MG: 150 INJECTION SUBCUTANEOUS at 17:07

## 2022-11-04 NOTE — CARDIOLOGY PROGRESS NOTE
Subjective


Date Seen by Provider:  Nov 4, 2022


Time Seen by Provider:  09:20


Subjective/Events-last exam


Patient was seen at bedside, laying down comfortably, feeling better.  Denied 

any chest pain.


Review of Systems


General:  No Chills, No Night Sweats, No Fatigue, No Malaise, No Appetite, No 

Other


HEENT:  No Head Aches, No Visual Changes, No Eye Pain, No Ear Pain, No 

Dysphasia, No Sinus Congestion, No Post Nasal Drip, No Sore Throat, No Other


Pulmonary:  No Dyspnea, No Cough, No Pleuritic Chest Pain, No Other


Cardiovascular:  No: Chest Pain, Palpitations, Orthopnea, Paroxysmal Noc. 

Dyspnea, Edema, Lt Headedness, Other





Objective-Cardiology


Exam


Last Set of Vital Signs





Vital Signs








 11/4/22 11/4/22





 07:52 08:00


 


Temp 36.8 


 


Pulse 84 


 


Resp 20 


 


B/P (MAP) 105/66 (79) 


 


Pulse Ox  96


 


O2 Delivery  Room Air








I&O











Intake and Output 


 


 11/4/22





 00:00


 


Intake Total 1200 ml


 


Output Total 425 ml


 


Balance 775 ml


 


 


 


Intake Oral 200 ml


 


IV Total 1000 ml


 


Output Urine Total 425 ml


 


Daily Weight Change No








General:  Alert, Oriented X3, Cooperative


HEENT:  Atraumatic, PERRLA


Neck:  Supple, No JVD, No Thyromegaly


Lungs:  Clear to Auscultation, Normal Air Movement


Heart:  Regular Rate, Normal S1, Normal S2, Other (Systolic murmur at the left 

sternal border)


Abdomen:  Normal Bowel Sounds, Soft, No Tenderness, No Hepatosplenomegaly, No 

Masses


Extremities:  No Clubbing, No Cyanosis, No Edema, Normal Pulses, No 

Tenderness/Swelling


Skin:  No Rashes, No Breakdown, No Significant Lesion


Neuro:  Normal Gait, Normal Speech, Strength at 5/5 X4 Ext, Normal Tone, 

Sensation Intact


Psych/Mental Status:  Mental Status NL, Mood NL





Results


Lab


Laboratory Tests


11/3/22 10:54








11/4/22 05:25














A/P-Cardiology


Admission Diagnosis


FINAL DIAGNOSIS





chest pain


Non-ST elevation myocardial infarction


Hypertension


Hyperlipidemia





Assessment/Plan


HOSPITAL COURSE





Chest pain, unstable angina, non-ST elevation myocardial infarction, elevation 

in troponin level


Cardiac catheterization was done on November 3, 2022.  Diffuse coronary ectasia 

was noted.  Subtotal occlusion in the mid first diagonal artery that is smaller 

artery not amendable to intervention.  Medical therapy is recommended.  Patient 

was started on aspirin and Plavix.


Planning for discharge today and follow-up as an outpatient





Coronary artery disease, had a cardiac catheterization 2008 showing mild to 

moderate coronary artery.


Cardiac catheterization carried out on November 3, 2022, diffuse coronary 

ectasia with multiple lesions about 50 to 60%.  iFR was done in all 3 arteries, 

RCA 0.97, left circumflex artery 0.97 and LAD 0.98.  Does not require any 

intervention or stenting.  Subtotal occlusion in the mid diagonal artery, small 

to moderate in size, not amendable to intervention.  Medical therapy is 

recommended





Hypertension, better controlled, monitor blood pressure





Hyperlipidemia, monitor lipids





Strong family history of heart disease.





Patient was educated on compliance with medications.  Arrange for follow-up in 1

to 2 weeks











REMY CAMARILLO MD               Nov 4, 2022 09:22

## 2022-11-04 NOTE — DISCHARGE SUMMARY
Discharge Summary


Hospital Course


Hospital Course


Date of Admission:  


Admission Diagnosis :  





Family Physician/Provider: [place]  





Date of Discharge: 11/4/22 


Discharge Diagnosis: [ ]








Hospital Course:


[ ]














Labs and Pending Lab Test:


Laboratory Tests


11/3/22 13:00: Troponin I 0.837*H


11/3/22 21:05: Glucometer 119H


11/4/22 05:25: 


White Blood Count 7.2, Red Blood Count 4.28L, Hemoglobin 11.9L, Hematocrit 38L, 

Mean Corpuscular Volume 89, Mean Corpuscular Hemoglobin 28, Mean Corpuscular 

Hemoglobin Concent 31L, Red Cell Distribution Width 13.2, Platelet Count 239, 

Mean Platelet Volume 8.8L, Immature Granulocyte % (Auto) 1, Neutrophils (%) 

(Auto) 70, Lymphocytes (%) (Auto) 18, Monocytes (%) (Auto) 9, Eosinophils (%) 

(Auto) 1, Basophils (%) (Auto) 1, Neutrophils # (Auto) 5.0, Lymphocytes # (Auto)

1.3, Monocytes # (Auto) 0.6, Eosinophils # (Auto) 0.1, Basophils # (Auto) 0.1, 

Immature Granulocyte # (Auto) 0.1, Sodium Level 139, Potassium Level 4.5, 

Chloride Level 110H, Carbon Dioxide Level 22, Anion Gap 7, Blood Urea Nitrogen 

18, Creatinine 1.03, Estimat Glomerular Filtration Rate 82, BUN/Creatinine Ratio

17, Glucose Level 111H, Calcium Level 8.2L, Corrected Calcium 8.8, Total 

Bilirubin 0.5, Aspartate Amino Transf (AST/SGOT) 25, Alanine Aminotransferase 

(ALT/SGPT) 17, Alkaline Phosphatase 64, Total Protein 6.0L, Albumin 3.2, 

Triglycerides Level 199H, Cholesterol Level 160, LDL Cholesterol Direct 105, 

VLDL Cholesterol 40, HDL Cholesterol 28L





Home Meds


Active


Protonix (Pantoprazole Sodium) 40 Mg Granpkt.dr 40 Mg PO DAILY


Aspirin EC (Aspirin) 81 Mg Tablet.dr 81 Mg PO DAILY


Losartan Potassium 25 Mg Tablet 25 Mg PO DAILY


Metoprolol Tartrate 25 Mg Tablet 25 Mg PO BID


Nitroglycerin 0.4 Mg Tab.subl 0.4 Mg SL AS NEEDED PRN


Isosorbide Mononitrate ER (Isosorbide Mononitrate) 30 Mg Tab.er.24h 30 Mg PO 

DAILY


Atorvastatin Calcium 80 Mg Tablet 80 Mg PO HS


Clopidogrel (Clopidogrel Bisulfate) 75 Mg Tablet 75 Mg PO DAILY


Ibuprofen 200 Mg Capsule 200 Mg PO PRN 30 Days





Discharge Physical Examination


Vital Signs





Vital Signs








  Date Time  Temp Pulse Resp B/P (MAP) Pulse Ox O2 Delivery O2 Flow Rate FiO2


 


11/4/22 08:00     96 Room Air  


 


11/4/22 07:52 36.8 84 20 105/66 (79)    








Allergies:  


Coded Allergies:  


     No Known Drug Allergies (Unverified , 3/21/07)





Discharge Summary


Date of Admission





Date of Discharge





Discharge Date:  Nov 4, 2022


Admission Diagnosis


Chest pain


NSTEMI


Obesity


Hypertension





Plan:


Cardiac cath





Clinical Quality Measures


AMI/AHF:


ASA po Prior to arrival:  No





DVT/VTE Risk/Contraindication:


VTE Addressed:  Yes


VTE Present on Admission:  No











CHITO DOTSON DO                 Nov 4, 2022 11:13

## 2022-11-05 VITALS — DIASTOLIC BLOOD PRESSURE: 91 MMHG | SYSTOLIC BLOOD PRESSURE: 112 MMHG

## 2022-11-05 VITALS — DIASTOLIC BLOOD PRESSURE: 77 MMHG | SYSTOLIC BLOOD PRESSURE: 126 MMHG

## 2022-11-05 VITALS — SYSTOLIC BLOOD PRESSURE: 137 MMHG | DIASTOLIC BLOOD PRESSURE: 90 MMHG

## 2022-11-05 VITALS — SYSTOLIC BLOOD PRESSURE: 118 MMHG | DIASTOLIC BLOOD PRESSURE: 71 MMHG

## 2022-11-05 LAB
ALBUMIN SERPL-MCNC: 3.4 GM/DL (ref 3.2–4.5)
ALP SERPL-CCNC: 68 U/L (ref 40–136)
ALT SERPL-CCNC: 17 U/L (ref 0–55)
BASOPHILS # BLD AUTO: 0.1 10^3/UL (ref 0–0.1)
BASOPHILS NFR BLD AUTO: 1 % (ref 0–10)
BILIRUB SERPL-MCNC: 0.5 MG/DL (ref 0.1–1)
BUN/CREAT SERPL: 13
CALCIUM SERPL-MCNC: 8.6 MG/DL (ref 8.5–10.1)
CHLORIDE SERPL-SCNC: 107 MMOL/L (ref 98–107)
CO2 SERPL-SCNC: 23 MMOL/L (ref 21–32)
CREAT SERPL-MCNC: 1.02 MG/DL (ref 0.6–1.3)
EOSINOPHIL # BLD AUTO: 0.2 10^3/UL (ref 0–0.3)
EOSINOPHIL NFR BLD AUTO: 2 % (ref 0–10)
GFR SERPLBLD BASED ON 1.73 SQ M-ARVRAT: 83 ML/MIN
GLUCOSE SERPL-MCNC: 119 MG/DL (ref 70–105)
HCT VFR BLD CALC: 38 % (ref 40–54)
HGB BLD-MCNC: 12.1 G/DL (ref 13.3–17.7)
LYMPHOCYTES # BLD AUTO: 1.4 10^3/UL (ref 1–4)
LYMPHOCYTES NFR BLD AUTO: 21 % (ref 12–44)
MANUAL DIFFERENTIAL PERFORMED BLD QL: NO
MCH RBC QN AUTO: 28 PG (ref 25–34)
MCHC RBC AUTO-ENTMCNC: 32 G/DL (ref 32–36)
MCV RBC AUTO: 87 FL (ref 80–99)
MONOCYTES # BLD AUTO: 0.7 10^3/UL (ref 0–1)
MONOCYTES NFR BLD AUTO: 11 % (ref 0–12)
NEUTROPHILS # BLD AUTO: 4.4 10^3/UL (ref 1.8–7.8)
NEUTROPHILS NFR BLD AUTO: 65 % (ref 42–75)
PLATELET # BLD: 222 10^3/UL (ref 130–400)
PMV BLD AUTO: 8.8 FL (ref 9–12.2)
POTASSIUM SERPL-SCNC: 4.3 MMOL/L (ref 3.6–5)
PROT SERPL-MCNC: 6.3 GM/DL (ref 6.4–8.2)
SODIUM SERPL-SCNC: 137 MMOL/L (ref 135–145)
WBC # BLD AUTO: 6.8 10^3/UL (ref 4.3–11)

## 2022-11-05 RX ADMIN — SODIUM CHLORIDE SCH MLS/HR: 900 INJECTION, SOLUTION INTRAVENOUS at 18:30

## 2022-11-05 RX ADMIN — SENNOSIDES SCH MG: 8.6 TABLET, FILM COATED ORAL at 20:40

## 2022-11-05 RX ADMIN — METOPROLOL TARTRATE SCH MG: 25 TABLET, FILM COATED ORAL at 20:39

## 2022-11-05 RX ADMIN — ENOXAPARIN SODIUM SCH MG: 150 INJECTION SUBCUTANEOUS at 02:37

## 2022-11-05 RX ADMIN — LOSARTAN POTASSIUM SCH MG: 25 TABLET, FILM COATED ORAL at 08:13

## 2022-11-05 RX ADMIN — DOCUSATE SODIUM SCH MG: 100 CAPSULE ORAL at 08:15

## 2022-11-05 RX ADMIN — ACETAMINOPHEN PRN MG: 325 TABLET ORAL at 05:30

## 2022-11-05 RX ADMIN — ACETAMINOPHEN PRN MG: 325 TABLET ORAL at 20:40

## 2022-11-05 RX ADMIN — PANTOPRAZOLE SODIUM SCH MG: 40 TABLET, DELAYED RELEASE ORAL at 08:13

## 2022-11-05 RX ADMIN — SENNOSIDES SCH MG: 8.6 TABLET, FILM COATED ORAL at 08:15

## 2022-11-05 RX ADMIN — RANOLAZINE SCH MG: 500 TABLET, FILM COATED, EXTENDED RELEASE ORAL at 20:39

## 2022-11-05 RX ADMIN — ACETAMINOPHEN PRN MG: 325 TABLET ORAL at 12:56

## 2022-11-05 RX ADMIN — ASPIRIN SCH MG: 81 TABLET ORAL at 08:13

## 2022-11-05 RX ADMIN — INSULIN ASPART SCH UNIT: 100 INJECTION, SOLUTION INTRAVENOUS; SUBCUTANEOUS at 16:15

## 2022-11-05 RX ADMIN — INSULIN ASPART SCH UNIT: 100 INJECTION, SOLUTION INTRAVENOUS; SUBCUTANEOUS at 05:28

## 2022-11-05 RX ADMIN — CLOPIDOGREL BISULFATE SCH MG: 75 TABLET, FILM COATED ORAL at 08:13

## 2022-11-05 RX ADMIN — DOCUSATE SODIUM SCH MG: 100 CAPSULE ORAL at 20:40

## 2022-11-05 RX ADMIN — ISOSORBIDE MONONITRATE SCH MG: 30 TABLET, EXTENDED RELEASE ORAL at 08:13

## 2022-11-05 RX ADMIN — METOPROLOL TARTRATE SCH MG: 25 TABLET, FILM COATED ORAL at 08:13

## 2022-11-05 RX ADMIN — INSULIN ASPART SCH UNIT: 100 INJECTION, SOLUTION INTRAVENOUS; SUBCUTANEOUS at 11:35

## 2022-11-05 RX ADMIN — INSULIN ASPART SCH UNIT: 100 INJECTION, SOLUTION INTRAVENOUS; SUBCUTANEOUS at 20:33

## 2022-11-05 RX ADMIN — SODIUM CHLORIDE SCH MLS/HR: 900 INJECTION, SOLUTION INTRAVENOUS at 08:30

## 2022-11-05 NOTE — CARDIOLOGY PROGRESS NOTE
Subjective


Date Seen by Provider:  Nov 5, 2022


Time Seen by Provider:  09:24


Subjective/Events-last exam


Patient has been having recurrent chest pain, describing dull achiness in the 

retrosternal area radiating to the back.


Review of Systems


General:  No Chills, No Night Sweats, No Fatigue, No Malaise, No Appetite, No 

Other


HEENT:  No Head Aches, No Visual Changes, No Eye Pain, No Ear Pain, No 

Dysphasia, No Sinus Congestion, No Post Nasal Drip, No Sore Throat, No Other


Pulmonary:  No Dyspnea, No Cough, No Pleuritic Chest Pain, No Other


Cardiovascular:  Chest Pain; No: Palpitations, Orthopnea, Paroxysmal Noc. 

Dyspnea, Edema, Lt Headedness, Other





Objective-Cardiology


Exam


Last Set of Vital Signs





Vital Signs








 11/5/22





 08:00


 


Temp 36.7


 


Pulse 74


 


Resp 16


 


B/P (MAP) 118/71 (87)


 


Pulse Ox 95


 


O2 Delivery Room Air








I&O











Intake and Output 


 


 11/5/22





 00:00


 


Intake Total 1912 ml


 


Output Total 1925 ml


 


Balance -13 ml


 


 


 


Intake Oral 1912 ml


 


Output Urine Total 1925 ml


 


# Bowel Movements 1








General:  Alert, Oriented X3, Cooperative


HEENT:  Atraumatic, PERRLA


Neck:  Supple, No JVD, No Thyromegaly


Lungs:  Clear to Auscultation, Normal Air Movement


Heart:  Regular Rate, Normal S1, Normal S2, Other (Systolic murmur at the left 

sternal border)


Abdomen:  Normal Bowel Sounds, Soft, No Tenderness, No Hepatosplenomegaly, No 

Masses


Extremities:  No Clubbing, No Cyanosis, No Edema, Normal Pulses, No 

Tenderness/Swelling


Skin:  No Rashes, No Breakdown, No Significant Lesion


Neuro:  Normal Gait, Normal Speech, Strength at 5/5 X4 Ext, Normal Tone, 

Sensation Intact


Psych/Mental Status:  Mental Status NL, Mood NL





Results


Lab


Laboratory Tests


11/5/22 04:37











A/P-Cardiology


Admission Diagnosis


FINAL DIAGNOSIS





chest pain


Non-ST elevation myocardial infarction


Hypertension


Hyperlipidemia





Assessment/Plan


Chest pain, unstable angina, non-ST elevation myocardial infarction, elevation 

in troponin level


Cardiac catheterization was done on November 3, 2022.  Diffuse coronary ectasia 

was noted.  Subtotal occlusion in the mid first diagonal artery that is smaller 

artery not amendable to intervention.  Medical therapy is recommended.  Patient 

was started on aspirin and Plavix.


Still having active chest pain, I will add Ranexa and evaluate tolerance and 

response





Coronary artery disease, had a cardiac catheterization 2008 showing mild to 

moderate coronary artery.


Cardiac catheterization carried out on November 3, 2022, diffuse coronary 

ectasia with multiple lesions about 50 to 60%.  IFR was done in all 3 arteries, 

RCA has 50 to 60% proximal stenosis with diffuse ectasia IFR 0.97, left 

circumflex artery has calcified lesion at the ostium with diffuse ectasia IFR 

0.97 and LAD has 50 to 60% stenosis with diffuse ectasia IFR 0.98.  Does not 

require any intervention or stenting.  Subtotal occlusion in the mid diagonal 

artery, small to moderate in size, not amendable to intervention.  Medical 

therapy is recommended


Still having chest pain probably due to the subtotal occlusion of the diagonal 

artery.  I am adding Ranexa.  If he continues to have chest pain I will consider

balloon angioplasty to that lesion





Hypertension, better controlled, monitor blood pressure





Hyperlipidemia, monitor lipids





Strong family history of heart disease.





Patient was educated on compliance with medications.  Arrange for follow-up in 1

to 2 weeks











REMY CAMARILLO MD               Nov 5, 2022 09:27

## 2022-11-06 VITALS — SYSTOLIC BLOOD PRESSURE: 127 MMHG | DIASTOLIC BLOOD PRESSURE: 84 MMHG

## 2022-11-06 VITALS — DIASTOLIC BLOOD PRESSURE: 92 MMHG | SYSTOLIC BLOOD PRESSURE: 148 MMHG

## 2022-11-06 VITALS — SYSTOLIC BLOOD PRESSURE: 144 MMHG | DIASTOLIC BLOOD PRESSURE: 85 MMHG

## 2022-11-06 LAB
ALBUMIN SERPL-MCNC: 3.7 GM/DL (ref 3.2–4.5)
ALP SERPL-CCNC: 71 U/L (ref 40–136)
ALT SERPL-CCNC: 18 U/L (ref 0–55)
BASOPHILS # BLD AUTO: 0.1 10^3/UL (ref 0–0.1)
BASOPHILS NFR BLD AUTO: 1 % (ref 0–10)
BILIRUB SERPL-MCNC: 0.5 MG/DL (ref 0.1–1)
BUN/CREAT SERPL: 12
CALCIUM SERPL-MCNC: 9.1 MG/DL (ref 8.5–10.1)
CHLORIDE SERPL-SCNC: 106 MMOL/L (ref 98–107)
CO2 SERPL-SCNC: 21 MMOL/L (ref 21–32)
CREAT SERPL-MCNC: 1.01 MG/DL (ref 0.6–1.3)
EOSINOPHIL # BLD AUTO: 0.2 10^3/UL (ref 0–0.3)
EOSINOPHIL NFR BLD AUTO: 2 % (ref 0–10)
GFR SERPLBLD BASED ON 1.73 SQ M-ARVRAT: 84 ML/MIN
GLUCOSE SERPL-MCNC: 120 MG/DL (ref 70–105)
HCT VFR BLD CALC: 40 % (ref 40–54)
HGB BLD-MCNC: 12.8 G/DL (ref 13.3–17.7)
LYMPHOCYTES # BLD AUTO: 1.4 10^3/UL (ref 1–4)
LYMPHOCYTES NFR BLD AUTO: 19 % (ref 12–44)
MANUAL DIFFERENTIAL PERFORMED BLD QL: NO
MCH RBC QN AUTO: 28 PG (ref 25–34)
MCHC RBC AUTO-ENTMCNC: 32 G/DL (ref 32–36)
MCV RBC AUTO: 87 FL (ref 80–99)
MONOCYTES # BLD AUTO: 0.8 10^3/UL (ref 0–1)
MONOCYTES NFR BLD AUTO: 11 % (ref 0–12)
NEUTROPHILS # BLD AUTO: 4.8 10^3/UL (ref 1.8–7.8)
NEUTROPHILS NFR BLD AUTO: 66 % (ref 42–75)
PLATELET # BLD: 219 10^3/UL (ref 130–400)
PMV BLD AUTO: 8.8 FL (ref 9–12.2)
POTASSIUM SERPL-SCNC: 4.3 MMOL/L (ref 3.6–5)
PROT SERPL-MCNC: 7 GM/DL (ref 6.4–8.2)
SODIUM SERPL-SCNC: 138 MMOL/L (ref 135–145)
WBC # BLD AUTO: 7.3 10^3/UL (ref 4.3–11)

## 2022-11-06 RX ADMIN — SODIUM CHLORIDE SCH MLS/HR: 900 INJECTION, SOLUTION INTRAVENOUS at 04:14

## 2022-11-06 RX ADMIN — SENNOSIDES SCH MG: 8.6 TABLET, FILM COATED ORAL at 08:05

## 2022-11-06 RX ADMIN — LOSARTAN POTASSIUM SCH MG: 25 TABLET, FILM COATED ORAL at 08:06

## 2022-11-06 RX ADMIN — ISOSORBIDE MONONITRATE SCH MG: 30 TABLET, EXTENDED RELEASE ORAL at 08:06

## 2022-11-06 RX ADMIN — ASPIRIN SCH MG: 81 TABLET ORAL at 08:06

## 2022-11-06 RX ADMIN — RANOLAZINE SCH MG: 500 TABLET, FILM COATED, EXTENDED RELEASE ORAL at 08:06

## 2022-11-06 RX ADMIN — DOCUSATE SODIUM SCH MG: 100 CAPSULE ORAL at 08:05

## 2022-11-06 RX ADMIN — METOPROLOL TARTRATE SCH MG: 25 TABLET, FILM COATED ORAL at 08:06

## 2022-11-06 RX ADMIN — CLOPIDOGREL BISULFATE SCH MG: 75 TABLET, FILM COATED ORAL at 08:06

## 2022-11-06 RX ADMIN — INSULIN ASPART SCH UNIT: 100 INJECTION, SOLUTION INTRAVENOUS; SUBCUTANEOUS at 06:00

## 2022-11-06 RX ADMIN — PANTOPRAZOLE SODIUM SCH MG: 40 TABLET, DELAYED RELEASE ORAL at 08:06

## 2022-11-06 RX ADMIN — INSULIN ASPART SCH UNIT: 100 INJECTION, SOLUTION INTRAVENOUS; SUBCUTANEOUS at 10:55

## 2022-11-06 RX ADMIN — ACETAMINOPHEN PRN MG: 325 TABLET ORAL at 13:27

## 2022-11-06 NOTE — CARDIOLOGY DISCHARGE SUMMARY
Discharge Summary


Hospital Course


Problems Reviewed?:  Yes


Hospital Course


Date of Admission:  


Admission Diagnosis :  





Family Physician/Provider: [facility]  





Date of Discharge: 11/6/22 


Discharge Diagnosis: [ ]





Hospital Course:


[Chest pain, unstable angina, non-ST elevation myocardial infarction, elevation 

in troponin level


Cardiac catheterization was done on November 3, 2022.  Diffuse coronary ectasia 

was noted.  Subtotal occlusion in the mid first diagonal artery that is smaller 

artery not amendable to intervention.  Medical therapy is recommended.  Patient 

was started on aspirin and Plavix.


Better after starting Ranexa





Coronary artery disease, had a cardiac catheterization 2008 showing mild to 

moderate coronary artery.


Cardiac catheterization carried out on November 3, 2022, diffuse coronary 

ectasia with multiple lesions about 50 to 60%.  IFR was done in all 3 arteries, 

RCA has 50 to 60% proximal stenosis with diffuse ectasia IFR 0.97, left 

circumflex artery has calcified lesion at the ostium with diffuse ectasia IFR 

0.97 and LAD has 50 to 60% stenosis with diffuse ectasia IFR 0.98.  Does not 

require any intervention or stenting.  Subtotal occlusion in the mid diagonal 

artery, small to moderate in size, not amendable to intervention.  Medical 

therapy is recommended


Still having chest pain probably due to the subtotal occlusion of the diagonal 

artery.  





Hypertension, better controlled, monitor blood pressure





Hyperlipidemia, monitor lipids





Strong family history of heart disease.





Patient was educated on compliance with medications.  Arrange for follow-up in 1

 to 2 weeks ]














Labs and Pending Lab Test:





Laboratory Tests


11/5/22 16:12: Glucometer 132H


11/5/22 20:31: Glucometer 127H


11/6/22 05:35: 


White Blood Count 7.3, Red Blood Count 4.64, Hemoglobin 12.8L, Hematocrit 40, 

Mean Corpuscular Volume 87, Mean Corpuscular Hemoglobin 28, Mean Corpuscular 

Hemoglobin Concent 32, Red Cell Distribution Width 13.3, Platelet Count 219, 

Mean Platelet Volume 8.8L, Immature Granulocyte % (Auto) 1, Neutrophils (%) 

(Auto) 66, Lymphocytes (%) (Auto) 19, Monocytes (%) (Auto) 11, Eosinophils (%) 

(Auto) 2, Basophils (%) (Auto) 1, Neutrophils # (Auto) 4.8, Lymphocytes # (Auto)

1.4, Monocytes # (Auto) 0.8, Eosinophils # (Auto) 0.2, Basophils # (Auto) 0.1, 

Immature Granulocyte # (Auto) 0.1, Sodium Level 138, Potassium Level 4.3, 

Chloride Level 106, Carbon Dioxide Level 21, Anion Gap 11, Blood Urea Nitrogen 

12, Creatinine 1.01, Estimat Glomerular Filtration Rate 84, BUN/Creatinine Ratio

12, Glucose Level 120H, Calcium Level 9.1, Corrected Calcium 9.3, Total 

Bilirubin 0.5, Aspartate Amino Transf (AST/SGOT) 22, Alanine Aminotransferase 

(ALT/SGPT) 18, Alkaline Phosphatase 71, Total Protein 7.0, Albumin 3.7


11/6/22 10:51: Glucometer 128H





Home Meds


Active


Protonix (Pantoprazole Sodium) 40 Mg Granpkt.dr 40 Mg PO DAILY


Aspirin EC (Aspirin) 81 Mg Tablet.dr 81 Mg PO DAILY


Losartan Potassium 25 Mg Tablet 25 Mg PO DAILY


Metoprolol Tartrate 25 Mg Tablet 25 Mg PO BID


Nitroglycerin 0.4 Mg Tab.subl 0.4 Mg SL AS NEEDED PRN


Isosorbide Mononitrate ER (Isosorbide Mononitrate) 30 Mg Tab.er.24h 30 Mg PO 

DAILY


Atorvastatin Calcium 80 Mg Tablet 80 Mg PO HS


Clopidogrel (Clopidogrel Bisulfate) 75 Mg Tablet 75 Mg PO DAILY


Ibuprofen 200 Mg Capsule 200 Mg PO PRN 30 Days


Assessment/Pt DC Instructions


Non-ST elevation myocardial infarction


Coronary artery disease


Hypertension


Hyperlipidemia





Discharge Physical Examination


Allergies:  


Coded Allergies:  


     No Known Drug Allergies (Unverified , 3/21/07)


General Appearance:  No Apparent Distress, WD/WN


HEENT:  PERRL/EOMI, TMs Normal, Normal ENT Inspection, Pharynx Normal


Respiratory:  Chest Non Tender, Lungs Clear, Normal Breath Sounds, No Accessory 

Muscle Use, No Respiratory Distress


Cardiovascular:  Regular Rate, Rhythm, No Edema, No Gallop, No JVD, No Murmur, 

Normal Peripheral Pulses


Gastrointestinal:  Normal Bowel Sounds, No Organomegaly, No Pulsatile Mass, Non 

Tender


Extremity:  Normal Capillary Refill, Normal Inspection, Normal Range of Motion, 

Non Tender, No Calf Tenderness


Skin:  Normal Color, Warm/Dry


Neurologic/Psychiatric:  Alert





Clinical Quality Measures


Admission Status


Admission Status:  Inpatient Order (span 2 midnights)


Reason for Inpatient Admission:  


Non-ST elevation myocardial infarction





AMI/AHF:


Ejection Fraction:  Normal LVSF


ASA po Prior to arrival:  No





DVT/VTE Risk/Contraindication:


VTE Addressed:  Yes


VTE Present on Admission:  No











REMY CAMARILLO MD               Nov 6, 2022 11:04

## 2023-10-06 ENCOUNTER — HOSPITAL ENCOUNTER (EMERGENCY)
Dept: HOSPITAL 75 - ER | Age: 63
Discharge: HOME | End: 2023-10-06
Payer: SELF-PAY

## 2023-10-06 VITALS — WEIGHT: 242.51 LBS | HEIGHT: 69.69 IN | BODY MASS INDEX: 35.11 KG/M2

## 2023-10-06 VITALS — DIASTOLIC BLOOD PRESSURE: 108 MMHG | SYSTOLIC BLOOD PRESSURE: 149 MMHG

## 2023-10-06 DIAGNOSIS — E11.9: Primary | ICD-10-CM

## 2023-10-06 DIAGNOSIS — Z79.82: ICD-10-CM

## 2023-10-06 DIAGNOSIS — Z79.02: ICD-10-CM

## 2023-10-06 LAB
ALBUMIN SERPL-MCNC: 4.1 GM/DL (ref 3.2–4.5)
ALP SERPL-CCNC: 166 U/L (ref 40–136)
ALT SERPL-CCNC: 28 U/L (ref 0–55)
APTT PPP: YELLOW S
BACTERIA #/AREA URNS HPF: NEGATIVE /HPF
BASOPHILS # BLD AUTO: 0.1 10^3/UL (ref 0–0.1)
BASOPHILS NFR BLD AUTO: 1 % (ref 0–10)
BILIRUB SERPL-MCNC: 0.8 MG/DL (ref 0.1–1)
BILIRUB UR QL STRIP: NEGATIVE
BUN/CREAT SERPL: 9
CALCIUM SERPL-MCNC: 8.9 MG/DL (ref 8.5–10.1)
CHLORIDE SERPL-SCNC: 96 MMOL/L (ref 98–107)
CO2 SERPL-SCNC: 21 MMOL/L (ref 21–32)
CREAT SERPL-MCNC: 1.46 MG/DL (ref 0.6–1.3)
EOSINOPHIL # BLD AUTO: 0.1 10^3/UL (ref 0–0.3)
EOSINOPHIL NFR BLD AUTO: 2 % (ref 0–10)
FIBRINOGEN PPP-MCNC: CLEAR MG/DL
GFR SERPLBLD BASED ON 1.73 SQ M-ARVRAT: 54 ML/MIN
GLUCOSE SERPL-MCNC: 622 MG/DL (ref 70–105)
GLUCOSE UR STRIP-MCNC: (no result) MG/DL
HCT VFR BLD CALC: 39 % (ref 40–54)
HGB BLD-MCNC: 13 G/DL (ref 13.3–17.7)
KETONES UR QL STRIP: NEGATIVE
LEUKOCYTE ESTERASE UR QL STRIP: NEGATIVE
LYMPHOCYTES # BLD AUTO: 1.6 10^3/UL (ref 1–4)
LYMPHOCYTES NFR BLD AUTO: 22 % (ref 12–44)
MANUAL DIFFERENTIAL PERFORMED BLD QL: NO
MCH RBC QN AUTO: 28 PG (ref 25–34)
MCHC RBC AUTO-ENTMCNC: 33 G/DL (ref 32–36)
MCV RBC AUTO: 85 FL (ref 80–99)
MONOCYTES # BLD AUTO: 0.7 10^3/UL (ref 0–1)
MONOCYTES NFR BLD AUTO: 9 % (ref 0–12)
NEUTROPHILS # BLD AUTO: 4.9 10^3/UL (ref 1.8–7.8)
NEUTROPHILS NFR BLD AUTO: 65 % (ref 42–75)
NITRITE UR QL STRIP: NEGATIVE
PH UR STRIP: 5.5 [PH] (ref 5–9)
PLATELET # BLD: 273 10^3/UL (ref 130–400)
PMV BLD AUTO: 9 FL (ref 9–12.2)
POTASSIUM SERPL-SCNC: 4.2 MMOL/L (ref 3.6–5)
PROT SERPL-MCNC: 7.6 GM/DL (ref 6.4–8.2)
PROT UR QL STRIP: NEGATIVE
RBC #/AREA URNS HPF: (no result) /HPF
SODIUM SERPL-SCNC: 130 MMOL/L (ref 135–145)
SP GR UR STRIP: <=1.005 (ref 1.02–1.02)
SQUAMOUS #/AREA URNS HPF: (no result) /HPF
WBC # BLD AUTO: 7.5 10^3/UL (ref 4.3–11)
WBC #/AREA URNS HPF: (no result) /HPF

## 2023-10-06 PROCEDURE — 82947 ASSAY GLUCOSE BLOOD QUANT: CPT

## 2023-10-06 PROCEDURE — 80053 COMPREHEN METABOLIC PANEL: CPT

## 2023-10-06 PROCEDURE — 85025 COMPLETE CBC W/AUTO DIFF WBC: CPT

## 2023-10-06 PROCEDURE — 93005 ELECTROCARDIOGRAM TRACING: CPT

## 2023-10-06 PROCEDURE — 36415 COLL VENOUS BLD VENIPUNCTURE: CPT

## 2023-10-06 PROCEDURE — 81000 URINALYSIS NONAUTO W/SCOPE: CPT

## 2023-10-06 PROCEDURE — 82010 KETONE BODYS QUAN: CPT

## 2023-10-06 NOTE — ED GENERAL
General


Chief Complaint:  Glucose Problems


Stated Complaint:  DIZZY | SHAKY


Nursing Triage Note:  


pt states he was at work and felt "shaky". 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 


blood sugar and it was reported to be in the 500 range. no hx of DM per the 


patient


Source of Information:  Patient


Exam Limitations:  No Limitations





History of Present Illness


Date Seen by Provider:  Oct 6, 2023


Time Seen by Provider:  10:22


Initial Comments


63-year-old male presents to the emergency department today after he felt dizzy,

shaky at work.  They took his blood sugar and it was in the 500 range.  He is 

not known to be diabetic.  He denies any recent fevers or chills.  For about 1 

week he has noted some excessive thirst with increased urine output.  No dysuria

or hematuria.  No chest pain, shortness of breath, abdominal pain nausea or v

omiting.  Does have known carotid stenosis around 30 to 50% and states he has a 

"mildly clogged artery in my left chest."  He does take aspirin and Plavix per 

record review.  Also takes several different medicines for high blood pressure. 

His mother was diabetic.





All other systems reviewed and negative except documented per HPI.





Voice recognition software was used to help create this chart





Allergies and Home Medications


Allergies


Coded Allergies:  


     No Known Drug Allergies (Unverified , 3/21/07)





Patient Home Medication List


Home Medication List Reviewed:  Yes


Aspirin (Aspirin EC) 81 Mg Tablet.dr, 81 MG PO DAILY


   Prescribed by: REMY CAMARILLO on 11/4/22 0925


Atorvastatin Calcium (Atorvastatin Calcium) 80 Mg Tablet, 80 MG PO HS


   Prescribed by: REMY CAMARILLO on 11/4/22 0925


Clopidogrel Bisulfate (Clopidogrel) 75 Mg Tablet, 75 MG PO DAILY


   Prescribed by: REMY CAMARILLO on 11/4/22 0925


Glyburide (Glyburide) 2.5 Mg Tablet, 2.5 MG PO BID


   Prescribed by: SITA WATTS MD on 10/6/23 1148


Isosorbide Mononitrate (Isosorbide Mononitrate ER) 30 Mg Tab.er.24h, 30 MG PO 

DAILY


   Prescribed by: REMY CAMARILLO on 11/4/22 0925


Losartan Potassium (Losartan Potassium) 25 Mg Tablet, 25 MG PO DAILY


   Prescribed by: REMY CAMARILLO on 11/4/22 0925


Metoprolol Tartrate (Metoprolol Tartrate) 25 Mg Tablet, 25 MG PO BID


   Prescribed by: REMY CAMARILLO on 11/4/22 0925


Nitroglycerin (Nitroglycerin) 0.4 Mg Tab.subl, 0.4 MG SL AS NEEDED PRN for CHEST

PAIN (ANGINA)


   Prescribed by: REMY CAMARILLO on 11/4/22 0925


Pantoprazole Sodium (Protonix) 40 Mg Granpkt.dr, 40 MG PO DAILY


   Prescribed by: REMY CAMARILLO on 11/4/22 0925


Ranolazine (Ranexa) 500 Mg Tab.er.12h, 500 MG PO BID


   Prescribed by: REMY CAMARILLO on 11/6/22 1101





Review of Systems


Review of Systems


Constitutional:  see HPI





Past Medical-Social-Family Hx


Patient Social History


Tobacco Use?:  No


Use of E-Cig and/or Vaping dev:  No


Substance use?:  No


Alcohol Use?:  No


Pt feels they are or have been:  No





Immunizations Up To Date


First/Initial COVID19 Vaccinat:  YES


Second COVID19 Vaccination Martin:  YES


Third COVID19 Vaccination Date:  YES





Seasonal Allergies


Seasonal Allergies:  Yes





Past Medical History


Surgery/Hospitalization HX:  


DOESN'T SEE A DR, KIDNEY TUMOR REMOVED AT 24 YO. TONSILLECTOMY AT 7 YO.  


PATIENT STATES HE HAD A "SIDE RUPTURE" AT 10 YEAR OLD.


High Cholesterol, Hypertension


Reproductive Disorders:  No


Hearing Impairment:  Hard of Hearing


Kidney


Did You Recieve Any Treatments:  Yes


What Type of Treatment Did You:  Chemotherapy, Surgical Intervention





Family Medical History


Heart Disease, CAD Under 55 Years Old, CAD Over 55 Years Old





Physical Exam


Vital Signs





Vital Signs - First Documented








 10/6/23





 10:25


 


Temp 37.2


 


Pulse 105


 


Resp 20


 


B/P (MAP) 149/108 (122)


 


Pulse Ox 96


 


O2 Delivery Room Air





Capillary Refill : Less Than 3 Seconds


Height, Weight, BMI


Height: '"


Weight: lbs. oz. kg; 35.00 BMI


Method:Stated


General Appearance:  No Apparent Distress, WD/WN


HEENT:  Normal ENT Inspection, Pharynx Normal


Neck:  Normal Inspection, Supple


Respiratory:  Chest Non Tender, Lungs Clear, Normal Breath Sounds, No Accessory 

Muscle Use, No Respiratory Distress


Cardiovascular:  No Murmur, Normal Peripheral Pulses, Tachycardia


Gastrointestinal:  Normal Bowel Sounds, No Organomegaly, Non Tender, Soft


Extremity:  Normal Capillary Refill, Normal Inspection, Non Tender, No Calf 

Tenderness


Neurologic/Psychiatric:  Alert, Oriented x3, No Motor/Sensory Deficits, Normal 

Mood/Affect


Skin:  Normal Color, Warm/Dry





Progress/Results/Core Measures


Suspected Sepsis


SIRS


Temperature: 


Pulse: 105 


Respiratory Rate: 20


 


Laboratory Tests


10/6/23 10:30: White Blood Count 7.5


Blood Pressure 149 /108 


Mean: 122


 





Laboratory Tests


10/6/23 10:30: 


Creatinine 1.46H, Platelet Count 273, Total Bilirubin 0.8








Results/Orders


Lab Results





Laboratory Tests








Test


 10/6/23


10:27 10/6/23


10:30 10/6/23


11:49 10/6/23


11:53 Range/Units


 


 


Glucometer 560 *H   463 *H   MG/DL


 


White Blood Count


 


 7.5 


 


 


 4.3-11.0


10^3/uL


 


Red Blood Count


 


 4.60 


 


 


 4.30-5.52


10^6/uL


 


Hemoglobin  13.0 L   13.3-17.7  g/dL


 


Hematocrit  39 L   40-54  %


 


Mean Corpuscular Volume  85    80-99  fL


 


Mean Corpuscular Hemoglobin  28    25-34  pg


 


Mean Corpuscular Hemoglobin


Concent 


 33 


 


 


 32-36  g/dL





 


Red Cell Distribution Width  12.6    10.0-14.5  %


 


Platelet Count


 


 273 


 


 


 130-400


10^3/uL


 


Mean Platelet Volume  9.0    9.0-12.2  fL


 


Immature Granulocyte % (Auto)  1     %


 


Neutrophils (%) (Auto)  65    42-75  %


 


Lymphocytes (%) (Auto)  22    12-44  %


 


Monocytes (%) (Auto)  9    0-12  %


 


Eosinophils (%) (Auto)  2    0-10  %


 


Basophils (%) (Auto)  1    0-10  %


 


Neutrophils # (Auto)


 


 4.9 


 


 


 1.8-7.8


10^3/uL


 


Lymphocytes # (Auto)


 


 1.6 


 


 


 1.0-4.0


10^3/uL


 


Monocytes # (Auto)


 


 0.7 


 


 


 0.0-1.0


10^3/uL


 


Eosinophils # (Auto)


 


 0.1 


 


 


 0.0-0.3


10^3/uL


 


Basophils # (Auto)


 


 0.1 


 


 


 0.0-0.1


10^3/uL


 


Immature Granulocyte # (Auto)


 


 0.1 


 


 


 0.0-0.1


10^3/uL


 


Sodium Level  130 L   135-145  MMOL/L


 


Potassium Level  4.2    3.6-5.0  MMOL/L


 


Chloride Level  96 L     MMOL/L


 


Carbon Dioxide Level  21    21-32  MMOL/L


 


Anion Gap  13    5-14  MMOL/L


 


Blood Urea Nitrogen  13    7-18  MG/DL


 


Creatinine


 


 1.46 H


 


 


 0.60-1.30


MG/DL


 


Estimat Glomerular Filtration


Rate 


 54 


 


 


  





 


BUN/Creatinine Ratio  9     


 


Glucose Level  622 *H     MG/DL


 


Calcium Level  8.9    8.5-10.1  MG/DL


 


Corrected Calcium  8.8    8.5-10.1  MG/DL


 


Total Bilirubin  0.8    0.1-1.0  MG/DL


 


Aspartate Amino Transf


(AST/SGOT) 


 19 


 


 


 5-34  U/L





 


Alanine Aminotransferase


(ALT/SGPT) 


 28 


 


 


 0-55  U/L





 


Alkaline Phosphatase  166 H     U/L


 


Total Protein  7.6    6.4-8.2  GM/DL


 


Albumin  4.1    3.2-4.5  GM/DL


 


Beta-Hydroxybutyrate (Chem


panel) 


 0.06 


 


 


 0.00-0.27


MMOL/L








My Orders





Orders - SITA WATTS DO


Ekg Tracing (10/6/23 10:31)


Beta Hydroxybutyrate (10/6/23 10:34)


Ua Culture If Indicated (10/6/23 10:34)


Ekg Tracing (10/6/23 10:34)


Cbc And Automated Diff (10/6/23 10:34)


Comprehensive Metabolic Panel (10/6/23 10:34)


Iv/Invasive Line Insertion .IV INSERT (10/6/23 10:35)


Ns Iv 1000 Ml (Ns Iv 1000 Ml) (10/6/23 10:35)


Glyburide Tablet (Glyburide Tablet) (10/6/23 12:00)


Accucheck Stat ONCE (10/6/23 11:51)





Vital Signs/I&O











 10/6/23





 10:25


 


Temp 37.2


 


Pulse 105


 


Resp 20


 


B/P (MAP) 149/108 (122)


 


Pulse Ox 96


 


O2 Delivery Room Air





Capillary Refill : Less Than 3 Seconds








Blood Pressure Mean:                    122











ECG


Comment


sinus tachycardia at 102 bpm, normal invervals, left axis deviation. Prominent Q

waves in lead 3. no ST or T waver abnormalities, no ectopy. No STEMI.





Departure


Communication (Admissions)


Patient is hemodynamically stable.  No evidence of DKA.  His sodium is slightly 

low, corrects for blood sugar.  Blood sugars are trending down after IV fluids, 

now 468.  I spoke with the patient and he does not want to stay in the hospital.

 Without I spoke with Dr. Camara who is on-call for the [facility] clinic.  She 

recommends glyburide 2.5 mg twice daily until he can be seen in [facility] clinic next 

week.  First dose was given here.  We discharged home in stable condition with 

supportive care and close return precautions





Impression





   Primary Impression:  


   Type 2 diabetes mellitus


   Qualified Codes:  E11.65 - Type 2 diabetes mellitus with hyperglycemia


Disposition:  01 HOME, SELF-CARE


Condition:  Stable





Departure-Patient Inst.


Referrals:  


REMY CAMARILLO MD (PCP)


Primary Care Physician








[facility] (Family)


Primary Care Physician


Patient Instructions:  Type 2 diabetes





Add. Discharge Instructions:  


Your blood sugars are high consistent with a new diagnosis of diabetes.  I spoke

with the hospitalist on-call for the [facility] clinic.  She recommends glyburide 2.5 

mg twice a day.  We have given your first dose here, take another dose before 

bedtime tonight and continue taking this until you are seen in the [facility] clinic.  

I recommend you get the earliest available appointment in the [facility] clinic next 

week.  They may want to start you on insulin or change up your medicine some 

should be able to recheck your blood sugar then.  In the meantime return to the 

emergency department immediately if you are dizzy, clammy lightheaded or if your

symptoms change in any way concerning to you





All discharge instructions reviewed with patient and/or family. Voiced 

understanding.


Scripts


Glyburide (Glyburide) 2.5 Mg Tablet


2.5 MG PO BID for 14 Days, #28 TAB


   Prov: SITA WATTS DO         10/6/23











SITA WATTS DO             Oct 6, 2023 10:38